# Patient Record
Sex: FEMALE | Race: BLACK OR AFRICAN AMERICAN
[De-identification: names, ages, dates, MRNs, and addresses within clinical notes are randomized per-mention and may not be internally consistent; named-entity substitution may affect disease eponyms.]

---

## 2018-08-01 ENCOUNTER — HOSPITAL ENCOUNTER (EMERGENCY)
Dept: HOSPITAL 72 - EMR | Age: 53
LOS: 1 days | Discharge: HOME | End: 2018-08-02
Payer: SELF-PAY

## 2018-08-01 VITALS — WEIGHT: 128 LBS | HEIGHT: 67 IN | BODY MASS INDEX: 20.09 KG/M2

## 2018-08-01 VITALS — SYSTOLIC BLOOD PRESSURE: 147 MMHG | DIASTOLIC BLOOD PRESSURE: 88 MMHG

## 2018-08-01 DIAGNOSIS — J45.909: ICD-10-CM

## 2018-08-01 DIAGNOSIS — K52.9: Primary | ICD-10-CM

## 2018-08-01 LAB
ADD MANUAL DIFF: NO
ALBUMIN SERPL-MCNC: 4.1 G/DL (ref 3.4–5)
ALBUMIN/GLOB SERPL: 1.2 {RATIO} (ref 1–2.7)
ALP SERPL-CCNC: 80 U/L (ref 46–116)
ALT SERPL-CCNC: 19 U/L (ref 12–78)
ANION GAP SERPL CALC-SCNC: 10 MMOL/L (ref 5–15)
APPEARANCE UR: CLEAR
APTT PPP: (no result) S
AST SERPL-CCNC: 14 U/L (ref 15–37)
BASOPHILS NFR BLD AUTO: 2 % (ref 0–2)
BILIRUB SERPL-MCNC: 0.3 MG/DL (ref 0.2–1)
BUN SERPL-MCNC: 5 MG/DL (ref 7–18)
CALCIUM SERPL-MCNC: 9.7 MG/DL (ref 8.5–10.1)
CHLORIDE SERPL-SCNC: 104 MMOL/L (ref 98–107)
CO2 SERPL-SCNC: 29 MMOL/L (ref 21–32)
CREAT SERPL-MCNC: 0.8 MG/DL (ref 0.55–1.3)
EOSINOPHIL NFR BLD AUTO: 2.7 % (ref 0–3)
ERYTHROCYTE [DISTWIDTH] IN BLOOD BY AUTOMATED COUNT: 13.8 % (ref 11.6–14.8)
GLOBULIN SER-MCNC: 3.5 G/DL
GLUCOSE UR STRIP-MCNC: NEGATIVE MG/DL
HCT VFR BLD CALC: 41.1 % (ref 37–47)
HGB BLD-MCNC: 13 G/DL (ref 12–16)
KETONES UR QL STRIP: NEGATIVE
LEUKOCYTE ESTERASE UR QL STRIP: (no result)
LYMPHOCYTES NFR BLD AUTO: 25.1 % (ref 20–45)
MCV RBC AUTO: 84 FL (ref 80–99)
MONOCYTES NFR BLD AUTO: 7.7 % (ref 1–10)
NEUTROPHILS NFR BLD AUTO: 62.6 % (ref 45–75)
NITRITE UR QL STRIP: NEGATIVE
PH UR STRIP: 8 [PH] (ref 4.5–8)
PLATELET # BLD: 298 K/UL (ref 150–450)
POTASSIUM SERPL-SCNC: 4.8 MMOL/L (ref 3.5–5.1)
PROT UR QL STRIP: NEGATIVE
RBC # BLD AUTO: 4.91 M/UL (ref 4.2–5.4)
SODIUM SERPL-SCNC: 143 MMOL/L (ref 136–145)
SP GR UR STRIP: 1.01 (ref 1–1.03)
UROBILINOGEN UR-MCNC: NORMAL MG/DL (ref 0–1)
WBC # BLD AUTO: 8.5 K/UL (ref 4.8–10.8)

## 2018-08-01 PROCEDURE — 83690 ASSAY OF LIPASE: CPT

## 2018-08-01 PROCEDURE — 96361 HYDRATE IV INFUSION ADD-ON: CPT

## 2018-08-01 PROCEDURE — 80053 COMPREHEN METABOLIC PANEL: CPT

## 2018-08-01 PROCEDURE — 74177 CT ABD & PELVIS W/CONTRAST: CPT

## 2018-08-01 PROCEDURE — 36415 COLL VENOUS BLD VENIPUNCTURE: CPT

## 2018-08-01 PROCEDURE — 81003 URINALYSIS AUTO W/O SCOPE: CPT

## 2018-08-01 PROCEDURE — 96375 TX/PRO/DX INJ NEW DRUG ADDON: CPT

## 2018-08-01 PROCEDURE — 85025 COMPLETE CBC W/AUTO DIFF WBC: CPT

## 2018-08-01 PROCEDURE — 96374 THER/PROPH/DIAG INJ IV PUSH: CPT

## 2018-08-01 PROCEDURE — 99284 EMERGENCY DEPT VISIT MOD MDM: CPT

## 2018-08-02 VITALS — SYSTOLIC BLOOD PRESSURE: 100 MMHG | DIASTOLIC BLOOD PRESSURE: 58 MMHG

## 2018-08-02 NOTE — DIAGNOSTIC IMAGING REPORT
Indication: Abdominal pain

 

Technique: CT of the abdomen and pelvis utilizing automated exposure control with

intravenous contrast. Venous scanning performed. Axial, sagittal and coronal

reformats presented.

 

CT dose: Total .69 mGycm; CTDI vol 10.99 mGy

 

Comparison: 11/3/2007

 

Findings: 

Minimal dependent atelectatic changes in the posterior lower lobes. Heart size within

normal limits. No pericardial effusion.

 

There is periportal edema, a nonspecific finding. Hepatic veins and portal veins

appear patent. Questionable gallbladder wall wall thickening. No CT evident

gallstones. No biliary ductal dilatation. Spleen, adrenal glands and pancreas

unremarkable. Kidneys enhance symmetrically and are without evidence of stone or

hydronephrosis. Bladder unremarkable. There is no free intraperitoneal air. Multiple

segments of bowel. Thick-walled including a long segment of the left colon and

portions of the stomach. Stomach thickening appears more than expected for

gastroenteritis or underdistention of the possibility of optic ulcer disease or mass

not entirely excluded. Endoscopy recommended. No inflammatory stranding noted about

the colon. There is a small appendicolith at the base of the appendix. No evidence of

appendicitis. However the contour of the uterus with enhancing masses compatible with

fibroids. There is some soft tissue density projecting in the adnexal regions which

could represent fibroids of the adnexal pathology is not excluded. Ultrasound would

provide better assessment. Abdominal aorta normal in caliber. No pathologically

enlarged lymphadenopathy. No acute bony abnormality. 

 

IMPRESSION: 

*  Thickening of stomach, some small bowel loops and the left colon. Findings may be

related to underdistention versus  gastroenteritis or colitis. Correlate clinically.

*  Thickening in the distal stomach more than expected for underdistention or

gastroenteritis. The possibility of peptic ulcer disease or mass in this region not

excludable. Recommend correlation with endoscopy.

*  Periportal edema. Nonspecific finding. Potential ulcerations include hepatitis,

fluid overload or cholangitis. Correlate clinically.

*  Possible gallbladder wall thickening may be artifactual related to periportal

edema. Is a right upper quadrant pain consider ultrasound.

*  Fibroid uterus. Soft tissue density in the adnexal regions may related to fibroids

versus adnexal lesions, similar to prior exam. Correlation with ultrasound

recommended. 

 

This corresponds with the preliminary report with mild discrepancy. Findings of final

report and follow-up recommendation of endoscopy discussed with Dr. Frederick of the

emergency department via telephone conversation approximately 10:45 AM on 8/2/2018.

 

The CT scanner at [facility] is accredited by the [employer]

[employer] and the scans are performed using protocols designed to limit radiation

exposure to as low as reasonably achievable to attain images of sufficient resolution

adequate for diagnostic evaluation.

## 2018-08-02 NOTE — EMERGENCY ROOM REPORT
History of Present Illness


General


Chief Complaint:  Abdominal Pain


Source:  Patient





Present Illness


HPI


52-year-old female presents ED complaining of abdominal pain 2 days.  States 

pain is epigastric, sharp, 7 out of 10, nonradiating.  Also notes nausea and 

vomiting.  Notes multiple watery stools.  Denies sick contacts or recent 

travel.  Denies recent antibiotic use.  No other aggravating relieving factors.

  Denies any other associated symptoms


Allergies:  


Coded Allergies:  


     PENICILLINS (Verified  Allergy, Unknown, 18)


     SULFA (SULFONAMIDE ANTIBIOTICS) (Verified  Allergy, Unknown, 18)





Patient History


Past Medical History:  asthma


Past Surgical History:  none


Pertinent Family History:  none


Social History:  Denies: smoking, alcohol use, drug use


Last Menstrual Period:  2018


Pregnant Now:  No


:  0


Para:  0


Immunizations:  UTD


Reviewed Nursing Documentation:  PMH: Agreed; PSxH: Agreed





Nursing Documentation-PMH


Hx Asthma:  Yes





Review of Systems


All Other Systems:  negative except mentioned in HPI





Physical Exam





Vital Signs








  Date Time  Temp Pulse Resp B/P (MAP) Pulse Ox O2 Delivery O2 Flow Rate FiO2


 


18 21:36 98.5 66 18 147/88 98 Room Air  





 98.4       








Sp02 EP Interpretation:  reviewed, normal


General Appearance:  no apparent distress, alert, GCS 15, non-toxic


Head:  normocephalic, atraumatic


Eyes:  bilateral eye normal inspection, bilateral eye PERRL


ENT:  hearing grossly normal, normal pharynx, no angioedema, normal voice


Neck:  full range of motion, supple/symm/no masses


Respiratory:  chest non-tender, lungs clear, normal breath sounds, speaking 

full sentences


Cardiovascular #1:  regular rate, rhythm, no edema


Cardiovascular #2:  2+ carotid (R), 2+ carotid (L), 2+ radial (R), 2+ radial (L)

, 2+ dorsalis pedis (R), 2+ dorsalis pedis (L)


Gastrointestinal:  normal bowel sounds, soft, non-distended, no guarding, no 

rebound, tenderness - epigastric


Rectal:  deferred


Genitourinary:  normal inspection, no CVA tenderness


Musculoskeletal:  back normal, gait/station normal, normal range of motion, non-

tender


Neurologic:  alert, oriented x3, responsive, motor strength/tone normal, 

sensory intact, speech normal


Psychiatric:  judgement/insight normal, memory normal, mood/affect normal, no 

suicidal/homicidal ideation


Reflexes:  3+ bicep (R), 3+ bicep (L), 3+ tricep (R), 3+ tricep (L), 3+ knee (R)

, 3+ knee (L)


Skin:  normal color, no rash, warm/dry, well hydrated


Lymphatic:  no adenopathy





Medical Decision Making


Diagnostic Impression:  


 Primary Impression:  


 Gastroenteritis


ER Course


Hospital Course 


52-year-old F presents to ED with abdominal pain





Differential diagnosis includes-appendicitis, cholecystitis, small bowel 

obstruction, gastritis, 





Clinical course


Patient placed on stretcher.  After initial history and physical I ordered labs

, IV fluids, pain medications and CT scan 





Labs - no leukocytosis, electrolytes ok, LFTs normal, UA unremarkable


CT scan shows colitis vs gastriits





Discussed findings with the patient.  Patient safe for discharge.  We'll 

prescribe Bentyl, Zantac, Zofran.  Patient will follow-up with PMD as outpatient





I feel this is a highly complex case requiring extensive working including EKG/

Rhythm strip, Xray/CT/US, Blood/urine lab work, repeat exams while in ED, and 

administration of strong opiates/narcotics for pain control, admission to 

hospital or close patient follow up.  





Diagnosis - gastroenteritis





Stable and discharged to home with Rx Zofran, Bentyl, Zantac.  Followup with 

PMD.  Return to ED if symptoms recur or worsen





Labs








Test


  18


21:50 18


22:10


 


Urine Color Pale yellow  


 


Urine Appearance Clear  


 


Urine pH 8 (4.5-8.0)  


 


Urine Specific Gravity


  1.010


(1.005-1.035) 


 


 


Urine Protein


  Negative


(NEGATIVE) 


 


 


Urine Glucose (UA)


  Negative


(NEGATIVE) 


 


 


Urine Ketones


  Negative


(NEGATIVE) 


 


 


Urine Occult Blood


  Negative


(NEGATIVE) 


 


 


Urine Nitrite


  Negative


(NEGATIVE) 


 


 


Urine Bilirubin


  Negative


(NEGATIVE) 


 


 


Urine Urobilinogen


  Normal MG/DL


(0.0-1.0) 


 


 


Urine Leukocyte Esterase 2+ (NEGATIVE)  


 


Urine RBC


  0-2 /HPF (0 -


2) 


 


 


Urine WBC


  2-4 /HPF (0 -


2) 


 


 


Urine Squamous Epithelial


Cells Occasional


/LPF 


 


 


Urine Bacteria


  Few /HPF


(NONE) 


 


 


White Blood Count


  


  8.5 K/UL


(4.8-10.8)


 


Red Blood Count


  


  4.91 M/UL


(4.20-5.40)


 


Hemoglobin


  


  13.0 G/DL


(12.0-16.0)


 


Hematocrit


  


  41.1 %


(37.0-47.0)


 


Mean Corpuscular Volume  84 FL (80-99) 


 


Mean Corpuscular Hemoglobin


  


  26.5 PG


(27.0-31.0)


 


Mean Corpuscular Hemoglobin


Concent 


  31.6 G/DL


(32.0-36.0)


 


Red Cell Distribution Width


  


  13.8 %


(11.6-14.8)


 


Platelet Count


  


  298 K/UL


(150-450)


 


Mean Platelet Volume


  


  7.6 FL


(6.5-10.1)


 


Neutrophils (%) (Auto)


  


  62.6 %


(45.0-75.0)


 


Lymphocytes (%) (Auto)


  


  25.1 %


(20.0-45.0)


 


Monocytes (%) (Auto)


  


  7.7 %


(1.0-10.0)


 


Eosinophils (%) (Auto)


  


  2.7 %


(0.0-3.0)


 


Basophils (%) (Auto)


  


  2.0 %


(0.0-2.0)


 


Sodium Level


  


  143 MMOL/L


(136-145)


 


Potassium Level


  


  4.8 MMOL/L


(3.5-5.1)


 


Chloride Level


  


  104 MMOL/L


()


 


Carbon Dioxide Level


  


  29 MMOL/L


(21-32)


 


Anion Gap


  


  10 mmol/L


(5-15)


 


Blood Urea Nitrogen  5 mg/dL (7-18) 


 


Creatinine


  


  0.8 MG/DL


(0.55-1.30)


 


Estimat Glomerular Filtration


Rate 


  > 60 mL/min


(>60)


 


Glucose Level


  


  94 MG/DL


()


 


Calcium Level


  


  9.7 MG/DL


(8.5-10.1)


 


Total Bilirubin


  


  0.3 MG/DL


(0.2-1.0)


 


Aspartate Amino Transf


(AST/SGOT) 


  14 U/L (15-37) 


 


 


Alanine Aminotransferase


(ALT/SGPT) 


  19 U/L (12-78) 


 


 


Alkaline Phosphatase


  


  80 U/L


()


 


Total Protein


  


  7.6 G/DL


(6.4-8.2)


 


Albumin


  


  4.1 G/DL


(3.4-5.0)


 


Globulin  3.5 g/dL 


 


Albumin/Globulin Ratio  1.2 (1.0-2.7) 


 


Lipase


  


  220 U/L


()








CT/MRI/US Diagnostic Results


CT/MRI/US Diagnostic Results :  


   Imaging Test Ordered:  CT A/P


   Impression


colitis vs gastritis





Last Vital Signs








  Date Time  Temp Pulse Resp B/P (MAP) Pulse Ox O2 Delivery O2 Flow Rate FiO2


 


18 00:48 98.8 60 16 100/58 99 Room Air  





 98.8       








Status:  improved


Disposition:  HOME, SELF-CARE


Condition:  Stable


Scripts


Ondansetron Odt* (ZOFRAN ODT*) 4 Mg Tab.rapdis


4 MG BC EVERY 6 HOURS PRN for Nausea & Vomiting, #10 TAB 0 Refills


   Prov: Jonathan Kumari MD         18 


Ranitidine Hcl* (ZANTAC*) 150 Mg Tablet


150 MG ORAL TWICE A DAY, #30 TAB


   Prov: Jonathan Kumari MD         18 


Dicyclomine Hcl* (DICYCLOMINE HCL*) 10 Mg Capsule


10 MG PO QID, #20 CAP


   Prov: Jonathan Kumari MD         18


Patient Instructions:  Viral Gastroenteritis, Adult, Easy-to-Read











Jonathan Kumari MD Aug 2, 2018 02:07